# Patient Record
Sex: FEMALE | Race: BLACK OR AFRICAN AMERICAN | NOT HISPANIC OR LATINO | Employment: FULL TIME | URBAN - METROPOLITAN AREA
[De-identification: names, ages, dates, MRNs, and addresses within clinical notes are randomized per-mention and may not be internally consistent; named-entity substitution may affect disease eponyms.]

---

## 2024-03-25 ENCOUNTER — TELEPHONE (OUTPATIENT)
Dept: SURGERY | Facility: OTHER | Age: 44
End: 2024-03-25

## 2024-03-25 NOTE — TELEPHONE ENCOUNTER
Caller: LUCY DING     Relationship: [facility]     Best call back number: [phone]     What is the best time to reach you:  ANYTIME     Who are you requesting to speak with (clinical staff, provider,  specific staff member):  CLINICAL STAFF         What was the call regarding:  PT CALLED 3/20/24 SELF REFERRAL # [id number]- SEND TO REVIEW - FOR ABNORMAL BLEEDING - PT HAS NOT HEARD ANYTHING BACK AND WAS WANTING TO SCHEDULE APPT- PT WAS ADVISED REFERRAL WAS S ENT TO REVIEW DUE TO ABNORMAL BLEEDING DX -

## 2024-03-28 ENCOUNTER — OFFICE VISIT (OUTPATIENT)
Dept: OBSTETRICS AND GYNECOLOGY | Facility: CLINIC | Age: 44
End: 2024-03-28
Payer: COMMERCIAL

## 2024-03-28 VITALS
WEIGHT: 181 LBS | BODY MASS INDEX: 30.9 KG/M2 | SYSTOLIC BLOOD PRESSURE: 156 MMHG | DIASTOLIC BLOOD PRESSURE: 88 MMHG | HEIGHT: 64 IN

## 2024-03-28 DIAGNOSIS — Z12.31 BREAST CANCER SCREENING BY MAMMOGRAM: ICD-10-CM

## 2024-03-28 DIAGNOSIS — N92.1 MENORRHAGIA WITH IRREGULAR CYCLE: Primary | ICD-10-CM

## 2024-03-28 DIAGNOSIS — D25.9 UTERINE LEIOMYOMA, UNSPECIFIED LOCATION: ICD-10-CM

## 2024-03-28 DIAGNOSIS — R55 VASOMOTOR INSTABILITY: ICD-10-CM

## 2024-03-28 DIAGNOSIS — R32 URINARY INCONTINENCE, UNSPECIFIED TYPE: ICD-10-CM

## 2024-03-28 LAB
B-HCG UR QL: NEGATIVE
BILIRUB BLD-MCNC: NEGATIVE MG/DL
CLARITY, POC: CLEAR
COLOR UR: YELLOW
EXPIRATION DATE: NORMAL
GLUCOSE UR STRIP-MCNC: NEGATIVE MG/DL
INTERNAL NEGATIVE CONTROL: NORMAL
INTERNAL POSITIVE CONTROL: NORMAL
KETONES UR QL: NEGATIVE
LEUKOCYTE EST, POC: NEGATIVE
Lab: NORMAL
NITRITE UR-MCNC: NEGATIVE MG/ML
PH UR: 5 [PH] (ref 5–8)
PROT UR STRIP-MCNC: ABNORMAL MG/DL
RBC # UR STRIP: ABNORMAL /UL
SP GR UR: 1 (ref 1–1.03)
UROBILINOGEN UR QL: NORMAL

## 2024-03-28 RX ORDER — IBUPROFEN 800 MG/1
800 TABLET ORAL EVERY 8 HOURS PRN
Qty: 60 TABLET | Refills: 2 | Status: SHIPPED | OUTPATIENT
Start: 2024-03-28

## 2024-03-28 RX ORDER — ACETAMINOPHEN AND CODEINE PHOSPHATE 120; 12 MG/5ML; MG/5ML
1 SOLUTION ORAL DAILY
Qty: 84 TABLET | Refills: 3 | Status: SHIPPED | OUTPATIENT
Start: 2024-03-28

## 2024-03-29 LAB
BASOPHILS # BLD AUTO: 0.03 10*3/MM3 (ref 0–0.2)
BASOPHILS NFR BLD AUTO: 0.4 % (ref 0–1.5)
EOSINOPHIL # BLD AUTO: 0.09 10*3/MM3 (ref 0–0.4)
EOSINOPHIL NFR BLD AUTO: 1.3 % (ref 0.3–6.2)
ERYTHROCYTE [DISTWIDTH] IN BLOOD BY AUTOMATED COUNT: 15.5 % (ref 12.3–15.4)
FERRITIN SERPL-MCNC: 7.93 NG/ML (ref 13–150)
HCT VFR BLD AUTO: 35.6 % (ref 34–46.6)
HGB BLD-MCNC: 11.3 G/DL (ref 12–15.9)
IMM GRANULOCYTES # BLD AUTO: 0.02 10*3/MM3 (ref 0–0.05)
IMM GRANULOCYTES NFR BLD AUTO: 0.3 % (ref 0–0.5)
LYMPHOCYTES # BLD AUTO: 1.82 10*3/MM3 (ref 0.7–3.1)
LYMPHOCYTES NFR BLD AUTO: 26.5 % (ref 19.6–45.3)
MCH RBC QN AUTO: 24.6 PG (ref 26.6–33)
MCHC RBC AUTO-ENTMCNC: 31.7 G/DL (ref 31.5–35.7)
MCV RBC AUTO: 77.6 FL (ref 79–97)
MONOCYTES # BLD AUTO: 0.53 10*3/MM3 (ref 0.1–0.9)
MONOCYTES NFR BLD AUTO: 7.7 % (ref 5–12)
NEUTROPHILS # BLD AUTO: 4.38 10*3/MM3 (ref 1.7–7)
NEUTROPHILS NFR BLD AUTO: 63.8 % (ref 42.7–76)
NRBC BLD AUTO-RTO: 0 /100 WBC (ref 0–0.2)
PLATELET # BLD AUTO: 243 10*3/MM3 (ref 140–450)
RBC # BLD AUTO: 4.59 10*6/MM3 (ref 3.77–5.28)
T3 SERPL-MCNC: 111 NG/DL (ref 80–200)
T4 FREE SERPL-MCNC: 1.03 NG/DL (ref 0.93–1.7)
THYROPEROXIDASE AB SERPL-ACNC: 17 IU/ML (ref 0–34)
TSH SERPL DL<=0.005 MIU/L-ACNC: 0.73 UIU/ML (ref 0.27–4.2)
WBC # BLD AUTO: 6.87 10*3/MM3 (ref 3.4–10.8)

## 2024-03-29 RX ORDER — FERROUS GLUCONATE 324(38)MG
324 TABLET ORAL 2 TIMES DAILY WITH MEALS
Qty: 100 TABLET | Refills: 2 | Status: SHIPPED | OUTPATIENT
Start: 2024-03-29

## 2024-06-03 ENCOUNTER — OFFICE VISIT (OUTPATIENT)
Dept: OBSTETRICS AND GYNECOLOGY | Facility: CLINIC | Age: 44
End: 2024-06-03
Payer: COMMERCIAL

## 2024-06-03 VITALS
DIASTOLIC BLOOD PRESSURE: 70 MMHG | WEIGHT: 178 LBS | SYSTOLIC BLOOD PRESSURE: 122 MMHG | HEIGHT: 63 IN | BODY MASS INDEX: 31.54 KG/M2

## 2024-06-03 DIAGNOSIS — D64.9 ANEMIA, UNSPECIFIED TYPE: ICD-10-CM

## 2024-06-03 DIAGNOSIS — Z01.419 CERVICAL SMEAR, AS PART OF ROUTINE GYNECOLOGICAL EXAMINATION: ICD-10-CM

## 2024-06-03 DIAGNOSIS — N92.1 MENORRHAGIA WITH IRREGULAR CYCLE: ICD-10-CM

## 2024-06-03 DIAGNOSIS — Z01.419 ROUTINE GYNECOLOGICAL EXAMINATION: Primary | ICD-10-CM

## 2024-06-03 DIAGNOSIS — Z12.31 BREAST CANCER SCREENING BY MAMMOGRAM: ICD-10-CM

## 2024-06-03 DIAGNOSIS — Z11.51 SPECIAL SCREENING EXAMINATION FOR HUMAN PAPILLOMAVIRUS (HPV): ICD-10-CM

## 2024-06-03 DIAGNOSIS — D25.9 UTERINE LEIOMYOMA, UNSPECIFIED LOCATION: ICD-10-CM

## 2024-06-03 LAB
B-HCG UR QL: NEGATIVE
BILIRUB BLD-MCNC: NEGATIVE MG/DL
CLARITY, POC: CLEAR
COLOR UR: YELLOW
EXPIRATION DATE: NORMAL
GLUCOSE UR STRIP-MCNC: NEGATIVE MG/DL
INTERNAL NEGATIVE CONTROL: NORMAL
INTERNAL POSITIVE CONTROL: NORMAL
KETONES UR QL: NEGATIVE
LEUKOCYTE EST, POC: NEGATIVE
Lab: NORMAL
NITRITE UR-MCNC: NEGATIVE MG/ML
PH UR: 5 [PH] (ref 5–8)
PROT UR STRIP-MCNC: NEGATIVE MG/DL
RBC # UR STRIP: NEGATIVE /UL
SP GR UR: 1 (ref 1–1.03)
UROBILINOGEN UR QL: NORMAL

## 2024-06-03 PROCEDURE — 99213 OFFICE O/P EST LOW 20 MIN: CPT | Performed by: NURSE PRACTITIONER

## 2024-06-03 PROCEDURE — 81002 URINALYSIS NONAUTO W/O SCOPE: CPT | Performed by: NURSE PRACTITIONER

## 2024-06-03 PROCEDURE — 99396 PREV VISIT EST AGE 40-64: CPT | Performed by: NURSE PRACTITIONER

## 2024-06-03 PROCEDURE — 81025 URINE PREGNANCY TEST: CPT | Performed by: NURSE PRACTITIONER

## 2024-06-03 NOTE — PROGRESS NOTES
GYN Annual Exam     Chief Complaint   Patient presents with    Gynecologic Exam    Fibroids       Josselyn Alcantara is a 43 y.o. female who presents for annual well woman exam and f/u fibroids. She is new to me - no. She is sexually active. Currently using s/p BTL for contraception. She is happy with her contraception: Yes.     Periods continue to be regular every 2 weeks, lasting 7 days with clots.  Occasional right-sided pelvic pain.  Pelvic US on 3/28/24 showed 7 fibroids.  Started on Micronor 3 months ago for menorrhagia with no improvement.   Hgb=11.3; ferritin= 7.9.  Thyroid panel WNL.  Taking ferrous gluconate twice daily for anemia and OTC IBU.  Performing SBE: yes; having bilateral breast tenderness; thinks it may be related to the Micronor.     HPI    OB History          3    Para   3    Term   3            AB        Living             SAB        IAB        Ectopic        Molar        Multiple        Live Births                    Last Pap: 6 years ago  Last Mammogram: never had one  Last Colonoscopy: N/A  History of abnormal Pap smear: yes - normal f/u  Family history of uterine, colon or ovarian cancer: no  Family history of breast cancer: no  History of abnormal mammogram:  never had one  Gardasil Vaccine: missed    Past Medical History:   Diagnosis Date    UTI (urinary tract infection)        Past Surgical History:   Procedure Laterality Date    HERNIA REPAIR      LAPAROSCOPIC TUBAL LIGATION           Current Outpatient Medications:     ferrous gluconate (FERGON) 324 MG tablet, Take 1 tablet by mouth 2 (Two) Times a Day With Meals., Disp: 100 tablet, Rfl: 2    ibuprofen (ADVIL,MOTRIN) 800 MG tablet, Take 1 tablet by mouth Every 8 (Eight) Hours As Needed for Moderate Pain (pain). (Patient not taking: Reported on 6/3/2024), Disp: 60 tablet, Rfl: 2    No Known Allergies    Social History     Tobacco Use    Smoking status: Never    Smokeless tobacco: Never   Vaping Use    Vaping status:  "Never Used   Substance Use Topics    Alcohol use: Never    Drug use: Never       Family History   Problem Relation Age of Onset    Hypertension Father     High cholesterol Father     Diabetes Father     Heart failure Father     Osteoporosis Mother     Ovarian cancer Neg Hx     Breast cancer Neg Hx     Uterine cancer Neg Hx     Colon cancer Neg Hx        Review of Systems   Constitutional:  Positive for unexpected weight gain.   Endocrine: Positive for heat intolerance.   Genitourinary:  Positive for breast pain (R>L), dyspareunia, menstrual problem and pelvic pain. Negative for breast discharge, breast lump and vaginal discharge.   Psychiatric/Behavioral:  Positive for sleep disturbance.        /70   Ht 160 cm (63\")   Wt 80.7 kg (178 lb)   LMP 05/20/2024   Breastfeeding No   BMI 31.53 kg/m²     Physical Exam  Vitals reviewed.   Constitutional:       General: She is awake. She is not in acute distress.     Appearance: She is not ill-appearing.   HENT:      Head: Normocephalic and atraumatic.   Eyes:      Conjunctiva/sclera: Conjunctivae normal.   Pulmonary:      Effort: Pulmonary effort is normal. No respiratory distress.   Chest:   Breasts:     Right: Tenderness present. No swelling, bleeding, inverted nipple, mass, nipple discharge or skin change.      Left: Tenderness present. No swelling, bleeding, inverted nipple, mass, nipple discharge or skin change.   Abdominal:      Palpations: Abdomen is soft. There is no mass.      Tenderness: There is no abdominal tenderness.   Genitourinary:     General: Normal vulva.      Exam position: Supine.      Pubic Area: No rash.       Labia:         Right: No rash.         Left: No rash.       Urethra: No urethral lesion.      Vagina: Normal.      Cervix: Normal.      Uterus: Tender.       Adnexa: Right adnexa normal and left adnexa normal.   Musculoskeletal:      Cervical back: Neck supple. No rigidity.   Lymphadenopathy:      Upper Body:      Right upper body: No " axillary adenopathy.      Left upper body: No axillary adenopathy.      Lower Body: No right inguinal adenopathy. No left inguinal adenopathy.   Skin:     General: Skin is warm and dry.      Capillary Refill: Capillary refill takes less than 2 seconds.   Neurological:      Mental Status: She is alert and oriented to person, place, and time.   Psychiatric:         Mood and Affect: Mood and affect normal.         Behavior: Behavior normal.            Assessment     Well woman exam   Contraception management  Menorrhagia  Fibroids    Plan   Well woman exam: Pap collected- Yes. Instructed on how to perform SBE. Recommend MVI daily.    Breast Health: Clinical breast exam & mammogram yearly, Self breast awareness. Schedule screening mammogram.  Colon health:  colonoscopy due at 46 y.o.  Contraception: s/p BTL  STD: Desires STD screen today- No.   Gardasil: missed  Smoking status:  No  Body mass index is 31.53 kg/m². Diet and exercised discussed.  9.   Menorrhagia: rec to stop taking Micronor since it is not helping  10. Uterine fibroids: need consult with MD to discussed surgical options  11.  Anemia: repeat H/H and Ferritin    Follow-up for consult for MD to discuss surgical options for fibroid/menorrhagia    I spent 15 minutes caring for Josselyn on this date of service. This time includes time spent by me in the following activities: preparing for the visit, reviewing tests, obtaining and/or reviewing a separately obtained history, performing a medically appropriate examination and/or evaluation, counseling and educating the patient/family/caregiver, ordering medications, tests, or procedures, and documenting information in the medical record.      I spent 5 minutes on the separately reported service of menorrhagia/fibroids. This time is not included in the time used to support the E/M service also reported today.     Clarice Dominguez, APRN  6/4/2024  09:01 EDT

## 2024-06-04 LAB
BASOPHILS # BLD AUTO: 0.03 10*3/MM3 (ref 0–0.2)
BASOPHILS NFR BLD AUTO: 0.4 % (ref 0–1.5)
EOSINOPHIL # BLD AUTO: 0.17 10*3/MM3 (ref 0–0.4)
EOSINOPHIL NFR BLD AUTO: 2.3 % (ref 0.3–6.2)
ERYTHROCYTE [DISTWIDTH] IN BLOOD BY AUTOMATED COUNT: 16.1 % (ref 12.3–15.4)
FERRITIN SERPL-MCNC: 22.1 NG/ML (ref 13–150)
HCT VFR BLD AUTO: 39.9 % (ref 34–46.6)
HGB BLD-MCNC: 13.1 G/DL (ref 12–15.9)
IMM GRANULOCYTES # BLD AUTO: 0.02 10*3/MM3 (ref 0–0.05)
IMM GRANULOCYTES NFR BLD AUTO: 0.3 % (ref 0–0.5)
LYMPHOCYTES # BLD AUTO: 1.92 10*3/MM3 (ref 0.7–3.1)
LYMPHOCYTES NFR BLD AUTO: 26.2 % (ref 19.6–45.3)
MCH RBC QN AUTO: 27.3 PG (ref 26.6–33)
MCHC RBC AUTO-ENTMCNC: 32.8 G/DL (ref 31.5–35.7)
MCV RBC AUTO: 83.1 FL (ref 79–97)
MONOCYTES # BLD AUTO: 0.57 10*3/MM3 (ref 0.1–0.9)
MONOCYTES NFR BLD AUTO: 7.8 % (ref 5–12)
NEUTROPHILS # BLD AUTO: 4.63 10*3/MM3 (ref 1.7–7)
NEUTROPHILS NFR BLD AUTO: 63 % (ref 42.7–76)
NRBC BLD AUTO-RTO: 0 /100 WBC (ref 0–0.2)
PLATELET # BLD AUTO: 217 10*3/MM3 (ref 140–450)
RBC # BLD AUTO: 4.8 10*6/MM3 (ref 3.77–5.28)
WBC # BLD AUTO: 7.34 10*3/MM3 (ref 3.4–10.8)

## 2024-06-05 ENCOUNTER — TELEPHONE (OUTPATIENT)
Dept: OBSTETRICS AND GYNECOLOGY | Facility: CLINIC | Age: 44
End: 2024-06-05

## 2024-06-05 LAB
CYTOLOGIST CVX/VAG CYTO: NORMAL
CYTOLOGY CVX/VAG DOC CYTO: NORMAL
CYTOLOGY CVX/VAG DOC THIN PREP: NORMAL
DX ICD CODE: NORMAL
HPV I/H RISK 4 DNA CVX QL PROBE+SIG AMP: NEGATIVE
Lab: NORMAL
OTHER STN SPEC: NORMAL
STAT OF ADQ CVX/VAG CYTO-IMP: NORMAL

## 2024-06-05 NOTE — TELEPHONE ENCOUNTER
Caller: Josselyn Alcantara    Relationship: Self    Best call back number: [phone]    What is the best time to reach you: ANYTIME    Who are you requesting to speak with (clinical staff, provider,  specific staff member): clinical staff,    Do you know the name of the person who called: NO    What was the call regarding: DON'T KNOW, MY VM IS FULL    Is it okay if the provider responds through [employer]: YES OR CALL BACK

## 2024-06-13 ENCOUNTER — TELEPHONE (OUTPATIENT)
Dept: OBSTETRICS AND GYNECOLOGY | Facility: CLINIC | Age: 44
End: 2024-06-13

## 2024-06-13 NOTE — TELEPHONE ENCOUNTER
Caller: NICOLE/[facility]    Relationship:     Best call back number: [phone]    What form or medical record are you requesting: LAST PAP NOTE FROM 6-3    Who is requesting this form or medical record from you: [facility]    How would you like to receive the form or medical records (pick-up, mail, fax): FAX    If fax, what is the fax number: [phone] ATTN: NICOLE    If mail, what is the address:   If pick-up, provide patient with address and location details    Timeframe paperwork needed: ANYTIME, IS NEEDED FOR RECORDS    Additional notes:

## 2024-08-29 ENCOUNTER — OFFICE VISIT (OUTPATIENT)
Dept: OBSTETRICS AND GYNECOLOGY | Facility: CLINIC | Age: 44
End: 2024-08-29
Payer: COMMERCIAL

## 2024-08-29 VITALS — HEIGHT: 63 IN | WEIGHT: 174 LBS | BODY MASS INDEX: 30.83 KG/M2

## 2024-08-29 DIAGNOSIS — Z13.89 SCREENING FOR GENITOURINARY CONDITION: ICD-10-CM

## 2024-08-29 DIAGNOSIS — D25.9 UTERINE LEIOMYOMA, UNSPECIFIED LOCATION: Primary | ICD-10-CM

## 2024-08-29 DIAGNOSIS — N92.1 MENORRHAGIA WITH IRREGULAR CYCLE: ICD-10-CM

## 2024-08-29 PROBLEM — D21.9 FIBROIDS: Status: ACTIVE | Noted: 2024-08-29

## 2024-08-29 LAB
BILIRUB BLD-MCNC: NEGATIVE MG/DL
CLARITY, POC: CLEAR
COLOR UR: YELLOW
GLUCOSE UR STRIP-MCNC: NEGATIVE MG/DL
KETONES UR QL: NEGATIVE
LEUKOCYTE EST, POC: NEGATIVE
NITRITE UR-MCNC: NEGATIVE MG/ML
PH UR: 5 [PH] (ref 5–8)
PROT UR STRIP-MCNC: ABNORMAL MG/DL
RBC # UR STRIP: ABNORMAL /UL
SP GR UR: 1 (ref 1–1.03)
UROBILINOGEN UR QL: NORMAL

## 2024-08-29 NOTE — PROGRESS NOTES
"Chief Complaint  Follow-up (Discuss surgery)    Subjective        Josselyn Alcantara presents to [facility] for follow-up of her uterine fibroids.  She was last seen this spring for evaluation where an ultrasound showed an enlarged uterus with multiple scattered fibroids.  She has described persistent intermenstrual, heavy vaginal bleeding with passage of clots.  She also is having worsening bulk symptoms with pelvic pain and bloating.  She tried contraceptive pills but still had some of the symptoms.  She is here for discussion on further management.    Objective   Vital Signs:  Ht 160 cm (63\")   Wt 78.9 kg (174 lb)   BMI 30.82 kg/m²   Estimated body mass index is 30.82 kg/m² as calculated from the following:    Height as of this encounter: 160 cm (63\").    Weight as of this encounter: 78.9 kg (174 lb).        Physical Exam  Constitutional:       General: She is not in acute distress.     Appearance: Normal appearance. She is not ill-appearing.   Eyes:      Pupils: Pupils are equal, round, and reactive to light.   Cardiovascular:      Rate and Rhythm: Normal rate.   Pulmonary:      Effort: Pulmonary effort is normal. No respiratory distress.   Neurological:      General: No focal deficit present.      Mental Status: She is alert.   Psychiatric:         Mood and Affect: Mood normal.         Thought Content: Thought content normal.        Result Review :           Assessment and Plan   Diagnoses and all orders for this visit:    1. Uterine leiomyoma, unspecified location (Primary)  -     Relugolix-Estradiol-Norethind 40-1-0.5 MG tablet; Take 1 tablet by mouth Daily for 90 days. Indications: Excessive Amount of Menstrual Volume, Fibroids, bulk symptoms  Dispense: 1 tablet; Refill: 1    2. Screening for genitourinary condition  -     POC Urinalysis Dipstick    3. Menorrhagia with irregular cycle  -     Relugolix-Estradiol-Norethind 40-1-0.5 MG tablet; Take 1 tablet by mouth Daily for 90 days. " Indications: Excessive Amount of Menstrual Volume, Fibroids, bulk symptoms  Dispense: 1 tablet; Refill: 1      First time meeting Yue, very pleasant with unfortunate worsening bulk symptoms and heavy menstrual bleeding due to her numerous fibroids.  Discussed available medical therapy aside from contraceptive medications, namely GnRH antagonists.  Discussed available surgical management including hysteroscopic resection, myomectomy, radiofrequency ablation, and definitive therapy with hysterectomy.  With her age and number of fibroids do not think that myomectomy or nonresectoscopic surgical management would be in her best interest.  I do think that hysterectomy would be beneficial for her considering how numerous her fibroids are and the worsening symptoms she is experiencing.  Discussed that Myfembree and Oriahnn are currently approved for up to 24 months for heavy menstrual bleeding due to fibroids.  Would recommend initiation of this therapy and reevaluation in 3 months to assess for her response to therapy.  At that time if she still wanted treatment her fibroids and uterus will have hopefully shrunk and we could repeat a transvaginal ultrasound to evaluate the size prior to surgery.  Would complete endometrial biopsy at that time and anticipate laparoscopic TLH BS cystoscopy.  If she would like to avoid surgery at that time and presuming good tolerance to therapy, we would then still have ~21 months to work with.  Myfembree sent to pharmacy.  All questions addressed and answered.       I spent 30 minutes caring for Josselyn on this date of service. This time includes time spent by me in the following activities:preparing for the visit, reviewing tests, obtaining and/or reviewing a separately obtained history, counseling and educating the patient/family/caregiver, ordering medications, tests, or procedures, and documenting information in the medical record  Follow Up   Return in about 3 months (around  11/29/2024) for f/u AUB, fibroids, on GNRH antagonist.  Patient was given instructions and counseling regarding her condition or for health maintenance advice. Please see specific information pulled into the AVS if appropriate.       Juan Contreras MD  8/29/2024   17:03 EDT

## 2025-02-19 DIAGNOSIS — D25.9 UTERINE LEIOMYOMA, UNSPECIFIED LOCATION: ICD-10-CM

## 2025-02-19 DIAGNOSIS — N92.1 MENORRHAGIA WITH IRREGULAR CYCLE: ICD-10-CM

## 2025-02-19 RX ORDER — IBUPROFEN 800 MG/1
800 TABLET, FILM COATED ORAL EVERY 8 HOURS PRN
Qty: 60 TABLET | Refills: 2 | Status: SHIPPED | OUTPATIENT
Start: 2025-02-19